# Patient Record
Sex: FEMALE | Race: WHITE | Employment: FULL TIME | URBAN - METROPOLITAN AREA
[De-identification: names, ages, dates, MRNs, and addresses within clinical notes are randomized per-mention and may not be internally consistent; named-entity substitution may affect disease eponyms.]

---

## 2019-08-04 ENCOUNTER — HOSPITAL ENCOUNTER (EMERGENCY)
Facility: CLINIC | Age: 29
Discharge: HOME | End: 2019-08-04
Attending: EMERGENCY MEDICINE | Admitting: EMERGENCY MEDICINE
Payer: COMMERCIAL

## 2019-08-04 ENCOUNTER — APPOINTMENT (OUTPATIENT)
Dept: GENERAL RADIOLOGY | Facility: CLINIC | Age: 29
End: 2019-08-04
Attending: EMERGENCY MEDICINE
Payer: COMMERCIAL

## 2019-08-04 VITALS
DIASTOLIC BLOOD PRESSURE: 86 MMHG | HEART RATE: 71 BPM | SYSTOLIC BLOOD PRESSURE: 119 MMHG | OXYGEN SATURATION: 98 % | RESPIRATION RATE: 18 BRPM | TEMPERATURE: 98.5 F

## 2019-08-04 DIAGNOSIS — R07.9 CHEST PAIN, UNSPECIFIED TYPE: ICD-10-CM

## 2019-08-04 PROCEDURE — 71046 X-RAY EXAM CHEST 2 VIEWS: CPT

## 2019-08-04 PROCEDURE — 99284 EMERGENCY DEPT VISIT MOD MDM: CPT | Mod: 25 | Performed by: EMERGENCY MEDICINE

## 2019-08-04 PROCEDURE — 93005 ELECTROCARDIOGRAM TRACING: CPT | Performed by: EMERGENCY MEDICINE

## 2019-08-04 PROCEDURE — 93010 ELECTROCARDIOGRAM REPORT: CPT | Mod: Z6 | Performed by: EMERGENCY MEDICINE

## 2019-08-04 PROCEDURE — 25000132 ZZH RX MED GY IP 250 OP 250 PS 637: Performed by: EMERGENCY MEDICINE

## 2019-08-04 RX ORDER — IBUPROFEN 600 MG/1
600 TABLET, FILM COATED ORAL ONCE
Status: COMPLETED | OUTPATIENT
Start: 2019-08-04 | End: 2019-08-04

## 2019-08-04 RX ADMIN — IBUPROFEN 600 MG: 600 TABLET ORAL at 17:17

## 2019-08-04 SDOH — HEALTH STABILITY: MENTAL HEALTH: HOW OFTEN DO YOU HAVE A DRINK CONTAINING ALCOHOL?: NEVER

## 2019-08-04 ASSESSMENT — ENCOUNTER SYMPTOMS
FEVER: 0
VOMITING: 0
DIZZINESS: 0
COUGH: 0
ABDOMINAL PAIN: 0
NAUSEA: 1
DIAPHORESIS: 0
SHORTNESS OF BREATH: 0

## 2019-08-04 NOTE — ED AVS SNAPSHOT
[facility], [place], Emergency Department  [address]  [facility] [address]  Phone:  [phone]  Fax:  [phone]                                    Linda Carballo   MRN: [id number]    Department:  [facility], Emergency Department   Date of Visit:  8/4/2019           After Visit Summary Signature Page    I have received my discharge instructions, and my questions have been answered. I have discussed any challenges I see with this plan with the nurse or doctor.    ..........................................................................................................................................  Patient/Patient Representative Signature      ..........................................................................................................................................  Patient Representative Print Name and Relationship to Patient    ..................................................               ................................................  Date                                   Time    ..........................................................................................................................................  Reviewed by Signature/Title    ...................................................              ..............................................  Date                                               Time          22EPIC Rev 08/18

## 2019-08-04 NOTE — DISCHARGE INSTRUCTIONS
You have been seen in the emergency department today for chest pain.  Your EKG and chest x-ray are normal.  We do not believe that there is any serious problems with your lungs or heart.  Sometimes stress can cause chest pain.  Your vital signs are normal. You should follow-up with your primary clinic if your symptoms persist, come back to the emergency department for greatly worsening symptoms.

## 2019-08-04 NOTE — ED PROVIDER NOTES
"  History     Chief Complaint   Patient presents with     Chest Pain     upper chest pain and nausea since walking 3 miles this afternoon     The history is provided by the patient.     Linda Carballo is a 28 year old female who presents to the ED with complaint of chest pain. Today, the patient was [place], about 3 miles. At the end of her walk she started to have chest pain, alternating \"sharp, left greater than right\" and is also a pressure. She expressed some nausea without vomiting. She denies shortness of breath. She is not diaphoretic. She denies recent surgeries. No leg pain, leg swelling or cough. She denies dizziness or abdominal pain.  No personal or family hx of thromboembolic disease. She reports an extensive history of mental health issues, and now is a time of high stress including separation from her partner. She notes \"freaking out\" last night, and reports feeling good this morning.    I have reviewed the Medications, Allergies, Past Medical and Surgical History, and Social History in the Epic system.    History reviewed. No pertinent past medical history.    History reviewed. No pertinent surgical history.    History reviewed. No pertinent family history.    Social History     Tobacco Use     Smoking status: Never Smoker     Smokeless tobacco: Never Used   Substance Use Topics     Alcohol use: Yes     Frequency: Never     Comment: rarely     No current facility-administered medications for this encounter.      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   Respiratory: Negative for cough and shortness of breath.    Cardiovascular: Positive for chest pain. Negative for leg swelling.   Gastrointestinal: Positive for nausea. Negative for abdominal pain and vomiting.   Neurological: Negative for dizziness.   All other systems reviewed and are negative.    Physical Exam   BP: (!) 137/92  Pulse: 71  Heart Rate: 70  Temp: 97.7  F (36.5  C)  Resp: 16  SpO2: 96 %      Physical Exam   Constitutional: She is oriented to person, place, and time. She appears well-developed and well-nourished. No distress.   Alert, cooperative, NAD.  Anxious   HENT:   Head: Normocephalic and atraumatic.   Eyes: Pupils are equal, round, and reactive to light. Conjunctivae and EOM are normal.   Neck: Normal range of motion. Neck supple.   Cardiovascular: Normal rate, regular rhythm, normal heart sounds and intact distal pulses.   Pulmonary/Chest: Effort normal and breath sounds normal. No respiratory distress. She has no wheezes. She has no rales.   Abdominal: Soft. Bowel sounds are normal. She exhibits no distension. There is no tenderness.   Musculoskeletal: She exhibits no edema.   No TTP of either lower extremity   Neurological: She is alert and oriented to person, place, and time.   Skin: Skin is warm and dry. She is not diaphoretic.   Psychiatric:   Mildly anxious   Nursing note and vitals reviewed.      ED Course        Procedures             EKG Interpretation:      Interpreted by Tavia Carr  Time reviewed: 1630  Symptoms at time of EKG: chest pain   Rhythm: normal sinus   Rate: normal  Axis: normal  Ectopy: none  Conduction: normal  ST Segments/ T Waves: No ST-T wave changes  Q Waves: none  Comparison to prior: No old EKG available    Clinical Impression: normal EKG          Critical Care time:  none             Labs Ordered and Resulted from Time of ED Arrival Up to the Time of  Departure from the ED - No data to display         Assessments & Plan (with Medical Decision Making)   This is a 28-year-old female who presents to the ED today with chest pain.  She describes pain that came on about 4 hours prior to presentation after she had walked 3 miles to the [place] rail [place] get to work.  The patient denies any shortness of breath, cough, nausea, or abdominal pain.  On exam she is alert,, no acute distress.  She is afebrile.  Vital signs are within normal limits.  On exam she appears mildly anxious.  Lungs are clear, heart exam appears normal.    Differential diagnosis of chest pain in a young patients could include musculoskeletal etiology, GERD, anxiety.  ACS would be extremely unlikely in 28-year-old, we did do an EKG and this shows normal sinus rhythm without any sign of ischemia.  Also need to consider pneumothorax, pneumonia, PE. I think these are unlikely.  However I do believe it is reasonable to do a simple chest x-ray.  We gave the patient Motrin here in the emergency department for her symptoms.  Chest x-ray is WNL.  She is feeling improved.  Patient will be reassured, I would advise her to monitor her symptoms and if she starts having worsening symptoms, shortness of breath or other new symptom I would advise reassessment either by her clinic or by the emergency department.  Patient verbalized understanding.    I have reviewed the nursing notes.    I have reviewed the findings, diagnosis, plan and need for follow up with the patient.     Medication List      There are no discharge medications for this visit.       Final diagnoses:   Chest pain, unspecified type   I, Beth Kenyon, am serving as a trained medical scribe to document services personally performed by Tavia Carr MD, based on the provider's statements to me.   I, Tavia Carr MD, was physically present and have reviewed and verified the accuracy of this note documented by Beth Kenyon.    8/4/2019   [facility],  [place], EMERGENCY DEPARTMENT     Tavia Carr MD  08/04/19 [id number]

## 2019-08-06 LAB — INTERPRETATION ECG - MUSE: NORMAL
